# Patient Record
Sex: FEMALE | URBAN - METROPOLITAN AREA
[De-identification: names, ages, dates, MRNs, and addresses within clinical notes are randomized per-mention and may not be internally consistent; named-entity substitution may affect disease eponyms.]

---

## 2021-02-15 ENCOUNTER — NEW PATIENT (OUTPATIENT)
Dept: URBAN - METROPOLITAN AREA CLINIC 56 | Facility: CLINIC | Age: 86
End: 2021-02-15
Payer: MEDICARE

## 2021-02-15 DIAGNOSIS — H53.8 OTHER VISUAL DISTURBANCES: ICD-10-CM

## 2021-02-15 DIAGNOSIS — I63.9 CEREBRAL INFARCTION, UNSPECIFIED: Primary | ICD-10-CM

## 2021-02-15 PROCEDURE — 92134 CPTRZ OPH DX IMG PST SGM RTA: CPT | Performed by: OPTOMETRIST

## 2021-02-15 PROCEDURE — 99203 OFFICE O/P NEW LOW 30 MIN: CPT | Performed by: OPTOMETRIST

## 2021-02-15 PROCEDURE — 92083 EXTENDED VISUAL FIELD XM: CPT | Performed by: OPTOMETRIST

## 2021-02-15 PROCEDURE — 92133 CPTRZD OPH DX IMG PST SGM ON: CPT | Performed by: OPTOMETRIST

## 2021-02-15 ASSESSMENT — VISUAL ACUITY
OD: 20/50
OS: 20/CF 3'

## 2021-02-15 ASSESSMENT — KERATOMETRY
OS: 44.04
OD: 44.63

## 2021-02-15 ASSESSMENT — INTRAOCULAR PRESSURE
OD: 14
OS: 16

## 2021-05-14 ENCOUNTER — OFFICE VISIT (OUTPATIENT)
Dept: URBAN - METROPOLITAN AREA CLINIC 56 | Facility: CLINIC | Age: 86
End: 2021-05-14
Payer: MEDICARE

## 2021-05-14 PROCEDURE — 92083 EXTENDED VISUAL FIELD XM: CPT | Performed by: OPTOMETRIST

## 2021-05-14 PROCEDURE — 99213 OFFICE O/P EST LOW 20 MIN: CPT | Performed by: OPTOMETRIST

## 2021-05-14 NOTE — IMPRESSION/PLAN
Impression: Other localized visual field defect, bilateral
-VF confirms right superior quadrant defect OU, consistent with stroke Plan: Patient education secondary to stroke. Will monitor.

## 2021-05-14 NOTE — IMPRESSION/PLAN
Impression: Diplopia ; OU
-resultant of stroke
-Non -comitant ; Bilateral restriction of nasal eye movement (does not cross midline). Alternating large angle exotropia
-Suspect WEBINO Plan: Discussed with patient and  that there is no change. Discussed referral to [facility] or [facility] (cards given again) DIscussed continuing to patch one eye, alternating eyes. RTC 1 year

## 2021-05-14 NOTE — IMPRESSION/PLAN
Impression: Stroke
-visual acuity improving OS Plan: Patient suffered stroke 12/23/20. Patient loss ability to speak, had weakness on right side, loss of vision OS. Patient has been improving with therapy on talking and walking.

## 2022-04-01 ENCOUNTER — OFFICE VISIT (OUTPATIENT)
Dept: URBAN - METROPOLITAN AREA CLINIC 56 | Facility: CLINIC | Age: 87
End: 2022-04-01

## 2022-04-01 DIAGNOSIS — H26.493 OTHER SECONDARY CATARACT, BILATERAL: ICD-10-CM

## 2022-04-01 DIAGNOSIS — H53.2 DIPLOPIA: ICD-10-CM

## 2022-04-01 DIAGNOSIS — H02.834 DERMATOCHALASIS OF LEFT UPPER EYELID: ICD-10-CM

## 2022-04-01 DIAGNOSIS — H35.63 RETINAL HEMORRHAGE, BILATERAL: Primary | ICD-10-CM

## 2022-04-01 DIAGNOSIS — H53.453 OTHER LOCALIZED VISUAL FIELD DEFECT, BILATERAL: ICD-10-CM

## 2022-04-01 DIAGNOSIS — H02.831 DERMATOCHALASIS OF RIGHT UPPER EYELID: ICD-10-CM

## 2022-04-01 DIAGNOSIS — H43.813 VITREOUS DEGENERATION, BILATERAL: ICD-10-CM

## 2022-04-01 DIAGNOSIS — Z96.1 PRESENCE OF INTRAOCULAR LENS: ICD-10-CM

## 2022-04-01 PROCEDURE — 92134 CPTRZ OPH DX IMG PST SGM RTA: CPT | Performed by: OPTOMETRIST

## 2022-04-01 PROCEDURE — 99214 OFFICE O/P EST MOD 30 MIN: CPT | Performed by: OPTOMETRIST

## 2022-04-01 ASSESSMENT — KERATOMETRY
OS: 43.61
OD: 45.32

## 2022-04-01 ASSESSMENT — VISUAL ACUITY
OD: 20/50
OS: 20/50

## 2022-04-01 ASSESSMENT — INTRAOCULAR PRESSURE
OS: 16
OD: 16

## 2022-04-01 NOTE — IMPRESSION/PLAN
Impression: Dermatochalasis of left upper eyelid: H02.834. Plan: Bothersome to patient, interfering with vision. 
Can set up Oculoplastics at anytime

## 2022-04-01 NOTE — IMPRESSION/PLAN
Impression: Other localized visual field defect, bilateral
-VF confirms right superior quadrant defect OU, consistent with stroke 2021 Plan: Patient education secondary to stroke. Appears stable.

## 2022-04-01 NOTE — IMPRESSION/PLAN
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 Plan: Dot blot hemes OU. 
Recommend patient see PCP to rule out DM

## 2022-04-01 NOTE — IMPRESSION/PLAN
Impression: Dermatochalasis of right upper eyelid: H02.831. Plan: Bothersome to patient, interfering with vision. 
Can set up Oculoplastics at anytime

## 2022-04-01 NOTE — IMPRESSION/PLAN
Impression: Diplopia ; OU
-resultant of stroke
-Non -comitant ; Bilateral restriction of nasal eye movement (does not cross midline). Alternating large angle exotropia
-Suspect WEBINO Plan: Discussed with patient and  that there is no change. Discussed referral to [facility] or [address] [place] [address] Ped Op[facility]mology (cards given again). Patient did not seek further care last year. Discussed continuing to patch one eye, alternating eyes. 
RTC 1 year

## 2024-07-23 ENCOUNTER — OFFICE VISIT (OUTPATIENT)
Dept: URBAN - METROPOLITAN AREA CLINIC 56 | Facility: LOCATION | Age: 89
End: 2024-07-23
Payer: COMMERCIAL

## 2024-07-23 DIAGNOSIS — H04.123 TEAR FILM INSUFFICIENCY OF BILATERAL LACRIMAL GLANDS: ICD-10-CM

## 2024-07-23 DIAGNOSIS — H35.63 RETINAL HEMORRHAGE, BILATERAL: ICD-10-CM

## 2024-07-23 DIAGNOSIS — H53.453 OTHER LOCALIZED VISUAL FIELD DEFECT, BILATERAL: ICD-10-CM

## 2024-07-23 DIAGNOSIS — H53.2 DIPLOPIA: Primary | ICD-10-CM

## 2024-07-23 DIAGNOSIS — H43.813 VITREOUS DEGENERATION, BILATERAL: ICD-10-CM

## 2024-07-23 DIAGNOSIS — Z96.1 PRESENCE OF INTRAOCULAR LENS: ICD-10-CM

## 2024-07-23 DIAGNOSIS — H35.3131 BILATERAL NONEXUDATIVE AGE-RELATED MACULAR DEGENERATION, EARLY DRY STAGE: ICD-10-CM

## 2024-07-23 DIAGNOSIS — H26.493 OTHER SECONDARY CATARACT, BILATERAL: ICD-10-CM

## 2024-07-23 PROCEDURE — 99214 OFFICE O/P EST MOD 30 MIN: CPT | Performed by: OPTOMETRIST

## 2024-07-23 PROCEDURE — 92134 CPTRZ OPH DX IMG PST SGM RTA: CPT | Performed by: OPTOMETRIST

## 2024-07-23 ASSESSMENT — VISUAL ACUITY
OS: 20/40
OD: 20/50

## 2024-07-23 ASSESSMENT — KERATOMETRY
OD: 45.06
OS: 45.52

## 2024-07-23 ASSESSMENT — INTRAOCULAR PRESSURE
OS: 17
OD: 18